# Patient Record
Sex: FEMALE | Race: WHITE | NOT HISPANIC OR LATINO | Employment: FULL TIME | URBAN - METROPOLITAN AREA
[De-identification: names, ages, dates, MRNs, and addresses within clinical notes are randomized per-mention and may not be internally consistent; named-entity substitution may affect disease eponyms.]

---

## 2017-03-02 ENCOUNTER — OFFICE VISIT (OUTPATIENT)
Dept: URGENT CARE | Facility: URGENT CARE | Age: 47
End: 2017-03-02
Payer: COMMERCIAL

## 2017-03-02 ENCOUNTER — RADIANT APPOINTMENT (OUTPATIENT)
Dept: GENERAL RADIOLOGY | Facility: CLINIC | Age: 47
End: 2017-03-02
Attending: PHYSICIAN ASSISTANT
Payer: COMMERCIAL

## 2017-03-02 VITALS
OXYGEN SATURATION: 97 % | TEMPERATURE: 98.2 F | SYSTOLIC BLOOD PRESSURE: 112 MMHG | HEART RATE: 72 BPM | HEIGHT: 69 IN | WEIGHT: 204.4 LBS | BODY MASS INDEX: 30.27 KG/M2 | DIASTOLIC BLOOD PRESSURE: 72 MMHG

## 2017-03-02 DIAGNOSIS — R09.89 RHONCHI AT RIGHT LUNG BASE: ICD-10-CM

## 2017-03-02 DIAGNOSIS — J18.9 PNEUMONIA OF RIGHT LUNG DUE TO INFECTIOUS ORGANISM, UNSPECIFIED PART OF LUNG: ICD-10-CM

## 2017-03-02 DIAGNOSIS — J18.9 PNEUMONIA OF RIGHT LUNG DUE TO INFECTIOUS ORGANISM, UNSPECIFIED PART OF LUNG: Primary | ICD-10-CM

## 2017-03-02 PROCEDURE — 99214 OFFICE O/P EST MOD 30 MIN: CPT | Performed by: PHYSICIAN ASSISTANT

## 2017-03-02 PROCEDURE — 71020 XR CHEST 2 VW: CPT

## 2017-03-02 RX ORDER — ALBUTEROL SULFATE 90 UG/1
2 AEROSOL, METERED RESPIRATORY (INHALATION) EVERY 6 HOURS
Qty: 1 INHALER | Refills: 0 | Status: SHIPPED | OUTPATIENT
Start: 2017-03-02 | End: 2019-12-26

## 2017-03-02 RX ORDER — AZITHROMYCIN 250 MG/1
TABLET, FILM COATED ORAL
Qty: 6 TABLET | Refills: 0 | Status: SHIPPED | OUTPATIENT
Start: 2017-03-02 | End: 2019-01-10

## 2017-03-02 RX ORDER — CODEINE PHOSPHATE AND GUAIFENESIN 10; 100 MG/5ML; MG/5ML
5-10 SOLUTION ORAL
Qty: 120 ML | Refills: 0 | Status: SHIPPED | OUTPATIENT
Start: 2017-03-02 | End: 2019-12-26

## 2017-03-02 NOTE — MR AVS SNAPSHOT
"              After Visit Summary   3/2/2017    Slime Sexton    MRN: [id number]           Patient Information     Date Of Birth          1970        Visit Information        Provider Department      3/2/2017 12:45 PM Jimenez Crow PA-C [facility]        Today's Diagnoses     Pneumonia of right lung due to infectious organism, unspecified part of lung    -  1    Rhonchi at right lung base           Follow-ups after your visit        Who to contact     If you have questions or need follow up information about today's clinic visit or your schedule please contact [facility] directly at [phone].  Normal or non-critical lab and imaging results will be communicated to you by [employer], letter or phone within 4 business days after the clinic has received the results. If you do not hear from us within 7 days, please contact the clinic through [employer] or phone. If you have a critical or abnormal lab result, we will notify you by phone as soon as possible.  Submit refill requests through [employer] or call your pharmacy and they will forward the refill request to us. Please allow 3 business days for your refill to be completed.          Additional Information About Your Visit        MyChart Information     [employer] gives you secure access to your electronic health record. If you see a primary care provider, you can also send messages to your care team and make appointments. If you have questions, please call your primary care clinic.  If you do not have a primary care provider, please call [phone] and they will assist you.        Care EveryWhere ID     This is your Care EveryWhere ID. This could be used by other organizations to access your [facility] medical records  [id number]        Your Vitals Were     Pulse Temperature Height Pulse Oximetry BMI (Body Mass Index)       72 98.2  F (36.8  C) (Oral) 5' 9\" (1.753 m) 97% 30.18 kg/m2        Blood " Pressure from Last 3 Encounters:   03/02/17 112/72   03/14/13 116/72   12/02/06 110/78    Weight from Last 3 Encounters:   03/02/17 204 lb 6.4 oz (92.7 kg)   03/14/13 186 lb 3.2 oz (84.5 kg)                 Today's Medication Changes          These changes are accurate as of: 3/2/17 11:59 PM.  If you have any questions, ask your nurse or doctor.               Start taking these medicines.        Dose/Directions    albuterol 108 (90 BASE) MCG/ACT Inhaler   Commonly known as:  PROVENTIL HFA   Used for:  Pneumonia of right lung due to infectious organism, unspecified part of lung, Rhonchi at right lung base   Started by:  Jimenez Crow PA-C        Dose:  2 puff   Inhale 2 puffs into the lungs every 6 hours   Quantity:  1 Inhaler   Refills:  0       azithromycin 250 MG tablet   Commonly known as:  ZITHROMAX   Used for:  Pneumonia of right lung due to infectious organism, unspecified part of lung, Rhonchi at right lung base   Started by:  Jimenez Crow PA-C        2 tabs po qd day 1, then 1 tab po qd days 2-5   Quantity:  6 tablet   Refills:  0       guaiFENesin-codeine 100-10 MG/5ML Soln solution   Commonly known as:  ROBITUSSIN AC   Used for:  Pneumonia of right lung due to infectious organism, unspecified part of lung   Started by:  Jimenez Crow PA-C        Dose:  5-10 mL   Take 5-10 mLs by mouth nightly as needed for cough   Quantity:  120 mL   Refills:  0            Where to get your medicines      [facility] [address] [address], [place] [address]     Phone:  [phone]     albuterol 108 (90 BASE) MCG/ACT Inhaler    azithromycin 250 MG tablet         Some of these will need a paper prescription and others can be bought over the counter.  Ask your nurse if you have questions.     Bring a paper prescription for each of these medications     guaiFENesin-codeine 100-10 MG/5ML Soln solution                 Primary Care Provider    None Specified       No primary provider on file.        Thank you!     Thank you for choosing [facility]  for your care. Our goal is always to provide you with excellent care. Hearing back from our patients is one way we can continue to improve our services. Please take a few minutes to complete the written survey that you may receive in the mail after your visit with us. Thank you!             Your Updated Medication List - Protect others around you: Learn how to safely use, store and throw away your medicines at www.disposemymeds.org.          This list is accurate as of: 3/2/17 11:59 PM.  Always use your most recent med list.                   Brand Name Dispense Instructions for use    albuterol 108 (90 BASE) MCG/ACT Inhaler    PROVENTIL HFA    1 Inhaler    Inhale 2 puffs into the lungs every 6 hours       azithromycin 250 MG tablet    ZITHROMAX    6 tablet    2 tabs po qd day 1, then 1 tab po qd days 2-5       guaiFENesin-codeine 100-10 MG/5ML Soln solution    ROBITUSSIN AC    120 mL    Take 5-10 mLs by mouth nightly as needed for cough       NO ACTIVE MEDICATIONS

## 2017-03-02 NOTE — NURSING NOTE
"Chief Complaint   Patient presents with     Cough     coughing, unable to sleep,lungs crackle 1x month        Initial /72  Pulse 72  Temp 98.2  F (36.8  C) (Oral)  Ht 5' 9\" (1.753 m)  Wt 204 lb 6.4 oz (92.7 kg)  SpO2 97%  BMI 30.18 kg/m2 Estimated body mass index is 30.18 kg/(m^2) as calculated from the following:    Height as of this encounter: 5' 9\" (1.753 m).    Weight as of this encounter: 204 lb 6.4 oz (92.7 kg).  Medication Reconciliation: complete    "

## 2017-03-03 NOTE — PROGRESS NOTES
"SUBJECTIVE:   Slime Sexton is a 46 year old female presenting with a chief complaint of coughing, chest congestion, productive cough and chills.  Onset of symptoms was 1 month(s) ago.  Course of illness is worsening.    Severity moderate  Current and Associated symptoms: wheezing  Treatment measures tried include OTC meds.  Predisposing factors include recent illness.    PMH:  none     Allergies   Allergen Reactions     Bee Venom Anaphylaxis     No Known Drug Allergies          Social History   Substance Use Topics     Smoking status: Never Smoker     Smokeless tobacco: Never Used     Alcohol use 0.5 oz/week     1 Cans of beer per week      Comment: per month       ROS:  CONSTITUTIONAL:POSITIVE  for chills  INTEGUMENTARY/SKIN: NEGATIVE for worrisome rashes, moles or lesions  ENT/MOUTH: NEGATIVE for ear, mouth and throat problems  RESP:POSITIVE for cough-productive and wheezing  CV: NEGATIVE for chest pain, palpitations or peripheral edema  GI: NEGATIVE for nausea, abdominal pain, heartburn, or change in bowel habits  MUSCULOSKELETAL: NEGATIVE for significant arthralgias or myalgia  NEURO: NEGATIVE for weakness, dizziness or paresthesias    OBJECTIVE  :/72  Pulse 72  Temp 98.2  F (36.8  C) (Oral)  Ht 5' 9\" (1.753 m)  Wt 204 lb 6.4 oz (92.7 kg)  SpO2 97%  BMI 30.18 kg/m2  GENERAL APPEARANCE: healthy, alert and no distress  EYES: EOMI,  PERRL, conjunctiva clear  HENT: ear canals and TM's normal.  Nose and mouth without ulcers, erythema or lesions  NECK: supple, nontender, no lymphadenopathy  RESP: expiratory wheezes   CV: regular rates and rhythm, normal S1 S2, no murmur noted  NEURO: Normal strength and tone, sensory exam grossly normal,  normal speech and mentation  SKIN: no suspicious lesions or rashes    Chest xray Negative for acute findings, read by Jimenez OBANDO at time of visit.    ASSESSMENT/PLAN:      ICD-10-CM    1. Pneumonia of right lung due to infectious organism, unspecified part of " lung J18.9 XR Chest 2 Views     azithromycin (ZITHROMAX) 250 MG tablet     albuterol (PROVENTIL HFA) 108 (90 BASE) MCG/ACT Inhaler     guaiFENesin-codeine (ROBITUSSIN AC) 100-10 MG/5ML SOLN solution   2. Rhonchi at right lung base R09.89 azithromycin (ZITHROMAX) 250 MG tablet     albuterol (PROVENTIL HFA) 108 (90 BASE) MCG/ACT Inhaler       Fluids, rest  Follow up as needed

## 2017-07-29 ENCOUNTER — HEALTH MAINTENANCE LETTER (OUTPATIENT)
Age: 47
End: 2017-07-29

## 2019-01-05 ENCOUNTER — OFFICE VISIT (OUTPATIENT)
Dept: URGENT CARE | Facility: URGENT CARE | Age: 49
End: 2019-01-05
Payer: COMMERCIAL

## 2019-01-05 ENCOUNTER — ANCILLARY PROCEDURE (OUTPATIENT)
Dept: GENERAL RADIOLOGY | Facility: CLINIC | Age: 49
End: 2019-01-05
Payer: COMMERCIAL

## 2019-01-05 VITALS
OXYGEN SATURATION: 97 % | DIASTOLIC BLOOD PRESSURE: 80 MMHG | BODY MASS INDEX: 31.28 KG/M2 | TEMPERATURE: 97.7 F | HEART RATE: 62 BPM | WEIGHT: 211.8 LBS | SYSTOLIC BLOOD PRESSURE: 122 MMHG

## 2019-01-05 DIAGNOSIS — R06.02 SOB (SHORTNESS OF BREATH): ICD-10-CM

## 2019-01-05 DIAGNOSIS — R09.89 CHEST CONGESTION: Primary | ICD-10-CM

## 2019-01-05 DIAGNOSIS — R07.89 ATYPICAL CHEST PAIN: ICD-10-CM

## 2019-01-05 DIAGNOSIS — R09.89 CHEST CONGESTION: ICD-10-CM

## 2019-01-05 LAB
BASOPHILS # BLD AUTO: 0 10E9/L (ref 0–0.2)
BASOPHILS NFR BLD AUTO: 0.2 %
D DIMER PPP FEU-MCNC: 0.2 UG/ML FEU (ref 0–0.5)
DIFFERENTIAL METHOD BLD: ABNORMAL
EOSINOPHIL # BLD AUTO: 0.1 10E9/L (ref 0–0.7)
EOSINOPHIL NFR BLD AUTO: 1.2 %
ERYTHROCYTE [DISTWIDTH] IN BLOOD BY AUTOMATED COUNT: 14.2 % (ref 10–15)
HCT VFR BLD AUTO: 39.8 % (ref 35–47)
HGB BLD-MCNC: 12.5 G/DL (ref 11.7–15.7)
LYMPHOCYTES # BLD AUTO: 2.3 10E9/L (ref 0.8–5.3)
LYMPHOCYTES NFR BLD AUTO: 27 %
MCH RBC QN AUTO: 25.3 PG (ref 26.5–33)
MCHC RBC AUTO-ENTMCNC: 31.4 G/DL (ref 31.5–36.5)
MCV RBC AUTO: 80 FL (ref 78–100)
MONOCYTES # BLD AUTO: 0.6 10E9/L (ref 0–1.3)
MONOCYTES NFR BLD AUTO: 6.9 %
NEUTROPHILS # BLD AUTO: 5.5 10E9/L (ref 1.6–8.3)
NEUTROPHILS NFR BLD AUTO: 64.7 %
PLATELET # BLD AUTO: 321 10E9/L (ref 150–450)
RBC # BLD AUTO: 4.95 10E12/L (ref 3.8–5.2)
WBC # BLD AUTO: 8.6 10E9/L (ref 4–11)

## 2019-01-05 PROCEDURE — 36415 COLL VENOUS BLD VENIPUNCTURE: CPT | Performed by: PHYSICIAN ASSISTANT

## 2019-01-05 PROCEDURE — 85379 FIBRIN DEGRADATION QUANT: CPT | Performed by: PHYSICIAN ASSISTANT

## 2019-01-05 PROCEDURE — 71046 X-RAY EXAM CHEST 2 VIEWS: CPT

## 2019-01-05 PROCEDURE — 99215 OFFICE O/P EST HI 40 MIN: CPT | Performed by: PHYSICIAN ASSISTANT

## 2019-01-05 PROCEDURE — 85025 COMPLETE CBC W/AUTO DIFF WBC: CPT | Performed by: PHYSICIAN ASSISTANT

## 2019-01-05 RX ORDER — AZITHROMYCIN 250 MG/1
TABLET, FILM COATED ORAL
Qty: 6 TABLET | Refills: 0 | Status: SHIPPED | OUTPATIENT
Start: 2019-01-05 | End: 2019-01-10

## 2019-01-07 NOTE — PROGRESS NOTES
SUBJECTIVE:  Slime Sexton is a 48 year old female who presents to the office with the CC of chest pain.  Patient complains of chest pain, starting 1 day ago  The pain is characterized as lung pain and pain with breathing  located mid chest with radiation to . Symptoms began 1 day(s) ago, still present  Pain is associated with pain with breathing.  Pain is exacerbated by breathing.  Pain is relieved by rest.  Cardiac risk factors: Obesity, sedentary lifestyle    PMH  Obesity    Allergies   Allergen Reactions     Bee Venom Anaphylaxis     No Known Drug Allergies      Social History     Tobacco Use     Smoking status: Never Smoker     Smokeless tobacco: Never Used   Substance Use Topics     Alcohol use: Yes     Alcohol/week: 0.5 oz     Types: 1 Cans of beer per week     Comment: per month     Family HX  HTN  Depression  Anxiety  GERD    ROS:CONSTITUTIONAL:NEGATIVE for fever, chills, change in weight  INTEGUMENTARY/SKIN: NEGATIVE for worrisome rashes, moles or lesions  EYES: NEGATIVE for vision changes or irritation  ENT/MOUTH: NEGATIVE for ear, mouth and throat problems  RESP:POSITIVE for pain with breathing  CV: POSITIVE for chest pain  GI: NEGATIVE for nausea, abdominal pain, heartburn, or change in bowel habits  : normal menstrual cycles  MUSCULOSKELETAL: NEGATIVE for significant arthralgias or myalgia  NEURO: NEGATIVE for weakness, dizziness or paresthesias    EXAM:  /80   Pulse 62   Temp 97.7  F (36.5  C) (Tympanic)   Wt 96.1 kg (211 lb 12.8 oz)   SpO2 97%   BMI 31.28 kg/m    GENERAL APPEARANCE: healthy, alert and no distress  EYES: EOMI,  PERRL, conjunctiva clear  HENT: ear canals and TM's normal.  Nose and mouth without ulcers, erythema or lesions  NECK: supple, nontender, no lymphadenopathy  RESP: lungs clear to auscultation - no rales, rhonchi or wheezes  CV: regular rates and rhythm, normal S1 S2, no murmur noted  ABDOMEN:  soft, nontender, no HSM or masses and bowel sounds normal  NEURO:  Normal strength and tone, sensory exam grossly normal,  normal speech and mentation  SKIN: no suspicious lesions or rashes     Office EKG demonstrates:  appears normal, NSR,normal axis, normal intervals, no acute ST/T changes c/w ischemia,no LVH by voltage criteria,unchanged from previous tracings    Chest xray Negative for acute findings, read by Jimenez OBANDO at time of visit.    Results for orders placed or performed in visit on 01/05/19   CBC with platelets differential   Result Value Ref Range    WBC 8.6 4.0 - 11.0 10e9/L    RBC Count 4.95 3.8 - 5.2 10e12/L    Hemoglobin 12.5 11.7 - 15.7 g/dL    Hematocrit 39.8 35.0 - 47.0 %    MCV 80 78 - 100 fl    MCH 25.3 (L) 26.5 - 33.0 pg    MCHC 31.4 (L) 31.5 - 36.5 g/dL    RDW 14.2 10.0 - 15.0 %    Platelet Count 321 150 - 450 10e9/L    % Neutrophils 64.7 %    % Lymphocytes 27.0 %    % Monocytes 6.9 %    % Eosinophils 1.2 %    % Basophils 0.2 %    Absolute Neutrophil 5.5 1.6 - 8.3 10e9/L    Absolute Lymphocytes 2.3 0.8 - 5.3 10e9/L    Absolute Monocytes 0.6 0.0 - 1.3 10e9/L    Absolute Eosinophils 0.1 0.0 - 0.7 10e9/L    Absolute Basophils 0.0 0.0 - 0.2 10e9/L    Diff Method Automated Method    D dimer quantitative   Result Value Ref Range    D Dimer 0.2 0.0 - 0.50 ug/ml FEU       Assessment  / IMPRESSION/PLAN      ICD-10-CM    1. Chest congestion R09.89 XR Chest 2 Views     CBC with platelets differential     D dimer quantitative     azithromycin (ZITHROMAX) 250 MG tablet   2. SOB (shortness of breath) R06.02 XR Chest 2 Views   3. Atypical chest pain R07.89 XR Chest 2 Views     CBC with platelets differential     D dimer quantitative       Orders Placed This Encounter     XR Chest 2 Views     CBC with platelets differential     D dimer quantitative     azithromycin (ZITHROMAX) 250 MG tablet       D-dimer to tule out PE  Chest xray to rule out pneumonia and pneumothorax  Cbc for infection  Follow up with PCP as needed  Go to the ED if symptoms worsen

## 2019-01-10 ENCOUNTER — OFFICE VISIT (OUTPATIENT)
Dept: INTERNAL MEDICINE | Facility: CLINIC | Age: 49
End: 2019-01-10
Payer: COMMERCIAL

## 2019-01-10 VITALS
WEIGHT: 211.9 LBS | DIASTOLIC BLOOD PRESSURE: 76 MMHG | OXYGEN SATURATION: 98 % | BODY MASS INDEX: 31.29 KG/M2 | SYSTOLIC BLOOD PRESSURE: 122 MMHG | HEART RATE: 74 BPM | TEMPERATURE: 98.6 F

## 2019-01-10 DIAGNOSIS — H00.011 HORDEOLUM EXTERNUM RIGHT UPPER EYELID: Primary | ICD-10-CM

## 2019-01-10 PROCEDURE — 99213 OFFICE O/P EST LOW 20 MIN: CPT | Performed by: PHYSICIAN ASSISTANT

## 2019-01-10 RX ORDER — CEPHALEXIN 500 MG/1
500 CAPSULE ORAL 4 TIMES DAILY
Qty: 28 CAPSULE | Refills: 0 | Status: SHIPPED | OUTPATIENT
Start: 2019-01-10 | End: 2019-01-17

## 2019-01-10 NOTE — PROGRESS NOTES
SUBJECTIVE:   Slime Sexton is a 48 year old female who presents to clinic today for the following health issues:    ED/UC Followup:    Facility:  [facility]  Date of visit: 01/05/2019  Reason for visit: Chest congestion  Current Status: Improved  Patient notes chest cold symptoms have resolved.   She now has new concerns for her right eye. She notes her eye is red and swollen. She notes the area is tender to touch. She has no vision changes or difficulty moving the eye itself. She reports it feels like a stye, but it is not improving as fast as it has in the past. She denies fever and body aches.      Problem list and histories reviewed & adjusted, as indicated.  Additional history: as documented    Reviewed and updated as needed this visit by clinical staff  Tobacco  Allergies  Meds  Problems       Reviewed and updated as needed this visit by Provider  Meds  Problems       OBJECTIVE:     /76   Pulse 74   Temp 98.6  F (37  C) (Oral)   Wt 96.1 kg (211 lb 14.4 oz)   SpO2 98%   BMI 31.29 kg/m    Body mass index is 31.29 kg/m .  GENERAL: healthy, alert and no distress  EYES: PERRL, EOMI, conjunctivae and sclerae normal and eyelids- right lid is swollen and erythematous at the bottom with a point of tenderness spot    ASSESSMENT/PLAN:       1. Hordeolum externum right upper eyelid  - suspect stye with co-infection   - treatment as below along with symptomatic measures   - follow up if no improvement or if symptoms worsen, reviewed when urgent follow up is needed   - cephALEXin (KEFLEX) 500 MG capsule; Take 1 capsule (500 mg) by mouth 4 times daily for 7 days  Dispense: 28 capsule; Refill: 0    Niecy M. Simone Munroe PA-C  [facility]

## 2019-09-07 ENCOUNTER — NURSE TRIAGE (OUTPATIENT)
Dept: NURSING | Facility: CLINIC | Age: 49
End: 2019-09-07

## 2019-09-07 ENCOUNTER — OFFICE VISIT (OUTPATIENT)
Dept: URGENT CARE | Facility: URGENT CARE | Age: 49
End: 2019-09-07
Payer: COMMERCIAL

## 2019-09-07 VITALS
OXYGEN SATURATION: 99 % | RESPIRATION RATE: 20 BRPM | TEMPERATURE: 98.1 F | BODY MASS INDEX: 32.34 KG/M2 | DIASTOLIC BLOOD PRESSURE: 70 MMHG | WEIGHT: 219 LBS | HEART RATE: 74 BPM | SYSTOLIC BLOOD PRESSURE: 120 MMHG

## 2019-09-07 DIAGNOSIS — R22.1 THROAT SWELLING: ICD-10-CM

## 2019-09-07 DIAGNOSIS — T78.2XXA ANAPHYLAXIS, INITIAL ENCOUNTER: Primary | ICD-10-CM

## 2019-09-07 DIAGNOSIS — L50.9 HIVES: ICD-10-CM

## 2019-09-07 PROCEDURE — 96372 THER/PROPH/DIAG INJ SC/IM: CPT | Performed by: PHYSICIAN ASSISTANT

## 2019-09-07 PROCEDURE — 99215 OFFICE O/P EST HI 40 MIN: CPT | Mod: 25 | Performed by: PHYSICIAN ASSISTANT

## 2019-09-07 RX ORDER — CETIRIZINE HYDROCHLORIDE 10 MG/1
10 TABLET ORAL ONCE
Status: COMPLETED | OUTPATIENT
Start: 2019-09-07 | End: 2019-09-07

## 2019-09-07 RX ORDER — EPINEPHRINE 0.3 MG/.3ML
0.3 INJECTION SUBCUTANEOUS
Qty: 0.3 ML | Refills: 0 | Status: SHIPPED | OUTPATIENT
Start: 2019-09-07

## 2019-09-07 RX ORDER — METHYLPREDNISOLONE SOD SUCC 125 MG
125 VIAL (EA) INJECTION ONCE
Status: COMPLETED | OUTPATIENT
Start: 2019-09-07 | End: 2019-09-07

## 2019-09-07 RX ORDER — CETIRIZINE HYDROCHLORIDE 10 MG/1
10 TABLET ORAL DAILY
Qty: 30 TABLET | Refills: 0 | Status: SHIPPED | OUTPATIENT
Start: 2019-09-07 | End: 2019-09-20

## 2019-09-07 RX ADMIN — CETIRIZINE HYDROCHLORIDE 10 MG: 10 TABLET ORAL at 13:50

## 2019-09-07 RX ADMIN — Medication 125 MG: at 13:50

## 2019-09-07 NOTE — TELEPHONE ENCOUNTER
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  Daughter is wanting to know if Slime should still come into ED.      Reason for Disposition    [1] Gave epinephrine shot AND [2] no symptoms now    Additional Information    Negative: [1] Life-threatening reaction (anaphylaxis) in the past to sting AND [2] < 2 hours since sting    Negative: Attacked by swarm of bees now    Negative: Passed out (i.e., lost consciousness, collapsed and was not responding)    Negative: Wheezing, stridor, or difficulty breathing    Negative: [1] Hoarseness or cough AND [2] sudden onset following sting    Negative: [1] Tightness in the throat or chest AND [2] sudden onset following sting    Negative: [1] Swollen tongue or difficulty swallowing AND [2] sudden onset following sting    Negative: Sounds like a life-threatening emergency to the triager    Negative: [1] Widespread hives, itching or facial swelling AND [2] started within 2 hours of sting (Exception: only at site of sting)    Negative: [1] Vomiting or abdominal cramps AND [2] started within 2 hours of sting    Protocols used: BEE OR YELLOW JACKET STING-A-

## 2019-09-07 NOTE — PATIENT INSTRUCTIONS
Patient Education     Anaphylaxis  Anaphylaxis is a severe allergic reaction that can be life threatening. This reaction can happen in a few minutes, or a few hours after exposure to what you are allergic to. Some people are more prone to this than others.  The symptoms of an anaphylactic reaction may at first seem similar to other allergic reactions. If this has happened to you in the past, do not let the initial mild symptoms, such as a rash, hives and itching, mislead you. Your reaction can worsen very quickly and become much more severe and life threatening within minutes.  More severe symptoms include:    Trouble swallowing, feeling like your throat is closing    Trouble breathing, wheezing    Cool, moist or pale (blue in color) skin    Hoarse voice or trouble speaking    Nausea, vomiting, diarrhea, stomach cramps, or pain    Feeling faint or lightheaded, rapid heart rate, low blood pressure    Feeling dizzy or confused    Becoming very drowsy, poorly responsive, or trouble awakening    Seizure  Sometimes the cause may be obvious, like knowing you are allergic to peanuts. To help identify your allergen, remember:    When it started    What you were doing at the time or just before that    Any activities you were involved in    Any new products or contacts   Here are some common causes, but remember almost anything can cause a reaction, and you may not even be aware that you came into contact with one of these things.    Dust, mold, pollen    Plants, such as poison ivy and poison oak    Animals    Foods such as shrimp, shellfish, peanuts, milk products, gluten, eggs; also colorings, flavorings, additives    Insect bites or stings such as bees, mosquitos, fleas, ticks    Medicines such as penicillin, sulfa drugs, amoxicillin, aspirin, ibuprofen; any medicine can cause a reaction    Jewelry such as nickel, or gold (new, or something you ve worn for a while including zippers, and buttons)    Latex such as in  gloves, clothes, toys, balloons, or some tapes (some people allergic to latex may also  have problems with foods like bananas, avocados, kiwi, papaya, or chestnuts)    Lotions, perfumes, cosmetics, soaps, shampoos, skincare products, nail products    Chemicals or dyes in clothing, linen, , hair dyes, soaps, iodine  If you are exposed to the same substance again, you may have the same or more severe reaction. Treatment for anaphylaxis is epinephrine (adrenalin). This is available by prescription as a self-injectable pen. If the cause of your reaction is known, you should avoid exposure in the future. If the cause is not known, follow up with your doctor for special testing to determine what you are allergic to.     Home care  If it was safe for you to go home, watch for any worsening of symptoms. You may need to be treated again.  Medicines  Injectable epinephrine  One of the key tools in treating anaphylaxis is early use of epinephrine. If you had a severe allergic or anaphylactic reaction, the doctor may prescribe a self- injectable epinephrine kit. If this was prescribed, carry it at all times. It can be life saving. Epinephrine can help stop the progression of an allergic reaction. Its effects are brief, so after you use the medicine, it is still very important to call 911 and get to an emergency room.  When to use injectable epinephrine. Use the epinephrine if you have a history of severe reactions or any of the following symptoms:    Swelling in your mouth or throat     Trouble speaking or swallowing    Trouble breathing    Feeling faint, low blood pressure, or becoming drowsy or poorly responsive    Worsening rash  How to use injectable epinephrine:    Hold the syringe firmly in your hand with the orange (or black) needle end away from your thumb    Be careful not to stick your fingers or hand with the needle.    At the opposite end, pull off the activation cap- the blue or grey tab    Holding the  syringe tightly, jab it into the outer part of your upper thigh. This is one of the softest, fleshiest parts of the upper leg, and is not near a major blood vessel or nerve. Be careful not to inject it into your hip or any place that there is a pulse.    You can inject it through pants, but make sure not to inject it into the seam of the pants.    Do not pull it out right away. Try to hold the needle in place for 10 seconds.    Massage the spot for a few seconds or as directed by your healthcare provider.    If you are injecting it in someone else or a child, try to hold them or their leg still. If they jerk or yank their legs away as you are doing it, it can cause a cut on their leg.  You may feel shaky, jittery, nervous, and anxious after the injection. Although it is difficult, try to relax. This is a side effect of the epinephrine, and should stop after a few minutes   Important    Get to the emergency room after using the epinephrine. Its affect will wear off, and you may have a second reaction. This could even happen hours later.    Never intentionally eat, use, or expose yourself to the substance that caused the anaphylactic reaction.  Nothing is foolproof, including the injectable epinephrine.  Other medicines  The doctor may prescribe medicine to relieve swelling, itching, and pain. Follow the doctor s instructions when using this medicine.     Oral diphenhydramine is an antihistamine available at drug and grocery stores. Unless a prescription antihistamine was given, diphenhydramine may be used to reduce itching if large areas of the skin are involved. It may make you sleepy, so be careful using it in the daytime or when going to school, working, or driving.     Do not use diphenhydramine cream on your skin, because in some people it can cause a further reaction.    You may use over-the-counter acetaminophen or ibuprofen to control pain, unless another pain medicine was prescribed.    If you were  prescribed any medicines to prevent symptoms from returning, be sure to take them exactly as directed.  General care    Rest at home for the next 24 hours.    Avoid tobacco and alcohol consumption. These may worsen your symptoms.    If you know what caused your reaction today, avoid that in the future since the next reaction may be worse. Let your family members, friends and personal physician know about your allergic reaction.    If your allergy was to food, learn how to read food labels so you can check for that ingredient. If a product does not have a label, it is best to avoid it.    Consider carrying an identification card or getting a medical alert bracelet to inform medical personnel of your condition in case you cannot tell them.    Tell all of your healthcare providers that you had an anaphylactic reaction. Make certain the information is added to your electronic or paper medical records.  Follow-up care  Follow up with your doctor or as advised if you are not improving over the next 1 to 2 days.  Call 911  Call 911 if any of these occur:    Trouble breathing or swallowing, wheezing    Hoarse voice or trouble speaking    Chest pain    Confused    Very drowsy or trouble awakening    Fainting or loss of consciousness    Rapid heart rate    Vomiting blood, or large amounts of blood in stool    Seizure    Swelling in the eyes, mouth, face, or tongue    Dizziness or weakness    Cool, moist, or pale (blue in color) skin    Nausea, vomiting, diarrhea, stomach cramps, or abdominal pain  When to seek medical advice  Call your healthcare provider right away if any of these occur:    Worsening of your symptoms    New symptoms develop    Symptoms don't go away or come back  Date Last Reviewed: 4/1/2017 2000-2018 [employer]. [address] [place] [address], [place] [address]. All rights reserved. This information is not intended as a substitute for professional medical care. Always follow your healthcare  professional's instructions.           Patient Education     Using an Injection Pen  Your healthcare provider has prescribed a medicine that you can give yourself using an injection pen. One medicine that is often given with an injection pen is insulin. Injection pens are popular because they are easy to use. Also many people feel more comfortable using something that looks like a pen instead of a syringe. Some kinds of pens you can throw away (disposable). Others should not be thrown away (nondisposable).  Disposable pens come already filled with a set amount of medicine. Once you inject the medicine you throw the pen away. With nondisposable pens, you replace the medicine barnett (cartridge) when it is empty.  Both types of pens use a pen needle. This is screwed onto the tip of the pen before you use it. Pen needles come in different lengths and thicknesses.  Home care  Follow these steps when using the injection pen. First, get these supplies together:    Alcohol swabs    Injector pen    Cartridge, if the pen is nondisposable    Special container for the used needles and disposable pens (sharps container). You can buy a sharps container at a pharmacy or medical supply store. You can also use an empty laundry detergent bottle, or any other puncture-proof container and lid.  Then get the pen ready:    Wash your hands.    Remove the pen cap.    Check the medicine to make sure it is the type your healthcare provider prescribed. Make sure that it has not , and is not discolored, frosted, or lumpy. If the medicine doesn't look right to you, don t use it. Get a new cartridge or a new disposable pen. Never share injection pens or medicine cartridges.    Attach a needle to your pen. Read the directions that came with your pen. They contain steps for attaching a needle. If you re using a nondisposable pen, don t leave the needle attached to the pen between shots.    Mix the medicine by rolling the pen between the  palms of your hands about 20 times. You can also tip the pen back and forth.  Prime your pen and make sure that it is working by doing a test shot into the air. Do this before your actually inject the medicine. Then set the dose.    Dial the pen to deliver 2 or 3 units of medicine.    Hold the pen like a pencil, with the needle pointing up.    Tap the barrel of the pen. This will make sure any air bubbles in the cartridge float to the top of the cartridge.    Push down firmly on the pen's injector button. This will shoot medicine into the air. You should see a couple of drops of medicine come out of the needle. If nothing comes out, try doing another air shot. If medicine still doesn't come out after a second try, your pen may be low on medicine. Or the needle may not be connected properly. Read the troubleshooting tips in the directions that came with your pen.    Set your dose. Dial the pen to deliver the amount of medicine you need to take. As you turn the dial, you should hear a clicking sound. Your pen is now ready to use.    Choose an injection site. The belly (abdomen), upper arms, thighs, and buttocks are the most common sites to use. Stay away from sites that are close to a mole or scar, or that are closer than 2 inches to your belly button.    Make sure the site is clean. Clean it with an alcohol swab. Let it dry.    Pinch up a fold of skin around the site you have selected. Hold it firmly with one hand.    Hold the injection pen like a pencil in your other hand.  Inject your medicine  Insert the needle into your pinched-up skin. The needle should be straight up and down. Thin adults or children may need to inject with the needle slightly to the left or right.    Make sure the needle gets all the way into the fatty tissue below the skin.    Push the pen injection button. Unless you take a very small dose, the injection should take a couple of seconds.    Let go of the skin and remove the needle from your  skin.  After the injection    For a nondisposable pen, remove the needle by unscrewing it.    Put any used needles and disposable pens in the sharps container. Make sure that needles point down. Never put your fingers into the container.    When the sharps container is full, take it back to your healthcare facility. The staff will get rid of it for you.  Follow-up care  Follow up with your healthcare provider, or as advised.  When to seek medical advice   Call your healthcare provider right away if any of these occur:    Problems that keep you from giving your injection    Bleeding at the injection site for more than 10 minutes    Pain at the injection site that does not go away    You inject the medicine in the wrong area    You inject too much of the medicine    Rash at the injection site    Fever of 100.4 F (38 C) or higher    Redness, warmth, swelling, or drainage at the injection site    Signs of allergic reaction. These include trouble breathing, hives, or a rash.  Date Last Reviewed: 6/1/2016 2000-2018 [employer]. [address] [place] [address]. All rights reserved. This information is not intended as a substitute for professional medical care. Always follow your healthcare professional's instructions.

## 2019-09-07 NOTE — NURSING NOTE
Clinic Administered Medication Documentation    MEDICATION LIST:   Injectable Medication Documentation    Patient was given solu medrol. Prior to medication administration, verified patients identity using patient s name and date of birth. Please see MAR and medication order for additional information. Patient instructed to remain in clinic for 15 minutes and report any adverse reaction to staff immediately .      Was entire vial of medication used? Yes  Vial/Syringe: Single dose vial  Expiration Date:  12/21  Was this medication supplied by the patient? No

## 2019-09-10 NOTE — PROGRESS NOTES
SUBJECTIVE:   Slime Sexton is a 48 year old female presenting with a chief complaint of anaphylactic reaction, throat swelling, lightheadedness.  Onset of symptoms was 1 hour(s) ago.  Course of illness is same.    Severity moderate  Current and Associated symptoms: throat swelling, hives, itching and chest tightness  Treatment measures tried include epi pen.  Predisposing factors include allergic reactions.    PMH  Allergies     Allergies   Allergen Reactions     Bee Venom Anaphylaxis     No Known Drug Allergies      Family hx  Allergies  Depression    Social History     Tobacco Use     Smoking status: Never Smoker     Smokeless tobacco: Never Used   Substance Use Topics     Alcohol use: Yes     Alcohol/week: 0.5 oz     Types: 1 Cans of beer per week     Comment: per month       ROS:  CONSTITUTIONAL:NEGATIVE for fever, chills, change in weight  INTEGUMENTARY/SKIN: POSITIVE for hives and itching  EYES: NEGATIVE for vision changes or irritation  ENT/MOUTH: POSITIVE for throat swelling  RESP:POSITIVE for chest tightness  CV: NEGATIVE for chest pain, palpitations or peripheral edema  GI: NEGATIVE for nausea, abdominal pain, heartburn, or change in bowel habits  : negative for and dysuria  MUSCULOSKELETAL: NEGATIVE for significant arthralgias or myalgia  NEURO: NEGATIVE for weakness, dizziness or paresthesias    OBJECTIVE  :/70 (Cuff Size: Adult Large)   Pulse 74   Temp 98.1  F (36.7  C) (Oral)   Resp 20   Wt 99.3 kg (219 lb)   SpO2 99%   BMI 32.34 kg/m    GENERAL APPEARANCE: healthy, alert and no distress  EYES: EOMI,  PERRL, conjunctiva clear  HENT: ear canals and TM's normal.  Nose and mouth without ulcers, erythema or lesions  NECK: supple, nontender, no lymphadenopathy  RESP: lungs clear to auscultation - no rales, rhonchi or wheezes  CV: regular rates and rhythm, normal S1 S2, no murmur noted  ABDOMEN: Negative for abdominal pain  Extremities: no peripheral edema or tenderness, peripheral  pulses normal  MS: extremities normal- no gross deformities noted, no erythema, FROM noted in all extremities  NEURO: Normal strength and tone, sensory exam grossly normal,  normal speech and mentation  SKIN: positive for itching    ASSESSMENT/PLAN:    ICD-10-CM    1. Anaphylaxis, initial encounter T78.2XXA cetirizine (zyrTEC) tablet 10 mg     methylPREDNISolone sodium succinate (solu-MEDROL) injection 125 mg     cetirizine (ZYRTEC) 10 MG tablet     EPINEPHrine (EPIPEN/ADRENACLICK/OR ANY BX GENERIC EQUIV) 0.3 MG/0.3ML injection 2-pack     INJECTION INTRAMUSCULAR OR SUB-Q   2. Hives L50.9 cetirizine (zyrTEC) tablet 10 mg     methylPREDNISolone sodium succinate (solu-MEDROL) injection 125 mg     cetirizine (ZYRTEC) 10 MG tablet     EPINEPHrine (EPIPEN/ADRENACLICK/OR ANY BX GENERIC EQUIV) 0.3 MG/0.3ML injection 2-pack     INJECTION INTRAMUSCULAR OR SUB-Q   3. Throat swelling R22.1 cetirizine (zyrTEC) tablet 10 mg     methylPREDNISolone sodium succinate (solu-MEDROL) injection 125 mg     cetirizine (ZYRTEC) 10 MG tablet     EPINEPHrine (EPIPEN/ADRENACLICK/OR ANY BX GENERIC EQUIV) 0.3 MG/0.3ML injection 2-pack     INJECTION INTRAMUSCULAR OR SUB-Q       Orders Placed This Encounter     INJECTION INTRAMUSCULAR OR SUB-Q     cetirizine (zyrTEC) tablet 10 mg     methylPREDNISolone sodium succinate (solu-MEDROL) injection 125 mg     cetirizine (ZYRTEC) 10 MG tablet     EPINEPHrine (EPIPEN/ADRENACLICK/OR ANY BX GENERIC EQUIV) 0.3 MG/0.3ML injection 2-pack     Patient received epi, solumedrol and zyrtec for allergic reactions, anaphylaxis  Patient has epi pen refills  Zyrtec for itching  Medrol 125 mg given IM  Information given on anaphylaxis reaction  Discussed future reactions and medications

## 2019-09-20 ENCOUNTER — TELEPHONE (OUTPATIENT)
Dept: INTERNAL MEDICINE | Facility: CLINIC | Age: 49
End: 2019-09-20

## 2019-09-20 ENCOUNTER — MYC MEDICAL ADVICE (OUTPATIENT)
Dept: INTERNAL MEDICINE | Facility: CLINIC | Age: 49
End: 2019-09-20

## 2019-09-20 ENCOUNTER — OFFICE VISIT (OUTPATIENT)
Dept: INTERNAL MEDICINE | Facility: CLINIC | Age: 49
End: 2019-09-20
Payer: COMMERCIAL

## 2019-09-20 VITALS
SYSTOLIC BLOOD PRESSURE: 118 MMHG | WEIGHT: 220 LBS | HEART RATE: 77 BPM | OXYGEN SATURATION: 98 % | RESPIRATION RATE: 18 BRPM | TEMPERATURE: 98.6 F | DIASTOLIC BLOOD PRESSURE: 76 MMHG | BODY MASS INDEX: 32.49 KG/M2

## 2019-09-20 DIAGNOSIS — M25.511 CHRONIC RIGHT SHOULDER PAIN: ICD-10-CM

## 2019-09-20 DIAGNOSIS — L98.9 SKIN LESION: ICD-10-CM

## 2019-09-20 DIAGNOSIS — Z13.220 LIPID SCREENING: ICD-10-CM

## 2019-09-20 DIAGNOSIS — G89.29 CHRONIC RIGHT SHOULDER PAIN: ICD-10-CM

## 2019-09-20 DIAGNOSIS — R10.13 EPIGASTRIC PAIN: Primary | ICD-10-CM

## 2019-09-20 PROCEDURE — 99214 OFFICE O/P EST MOD 30 MIN: CPT | Performed by: PHYSICIAN ASSISTANT

## 2019-09-20 RX ORDER — OMEPRAZOLE 40 MG/1
40 CAPSULE, DELAYED RELEASE ORAL DAILY
Qty: 90 CAPSULE | Refills: 1 | Status: SHIPPED | OUTPATIENT
Start: 2019-09-20 | End: 2020-09-01

## 2019-09-20 SDOH — HEALTH STABILITY: MENTAL HEALTH: HOW OFTEN DO YOU HAVE A DRINK CONTAINING ALCOHOL?: MONTHLY OR LESS

## 2019-09-20 NOTE — PROGRESS NOTES
Subjective     Slime Sexton is a 48 year old female who presents to clinic today for the following health issues:    HPI     Patient is here today because of questions about symptoms she is now having.   Saturday of last week feeling heaviness in chest, Sunday having a lot of acid reflux symptoms such as burning in throat, feeling uneasy, nauseous, and no appetite.   - Saturday, duration 30 minutes, followed chips and salsa was at [employer]   - Sunday, duration 8 hours, off and on, eating breakfast   - Patient continues to have symptoms daily   - then noticed a few days later, noticed something on ribs, thinks hernia  - lots of burping, some acid taste and regurgitation   - does some walking, no symptoms   - some heaviness, but no chest pain, shortness of breath       Reviewed and updated as needed this visit by Provider  Meds  Problems             Objective    /76   Pulse 77   Temp 98.6  F (37  C) (Oral)   Resp 18   Wt 99.8 kg (220 lb)   LMP 08/15/2019   SpO2 98%   BMI 32.49 kg/m    Body mass index is 32.49 kg/m .  Physical Exam   GENERAL: healthy, alert and no distress  RESP: lungs clear to auscultation - no rales, rhonchi or wheezes  CV: regular rates and rhythm, normal S1 S2, no S3 or S4 and no murmur, click or rub  ABDOMEN: soft,  without hepatosplenomegaly or masses, tenderness epigastric and bowel sounds normal  MS: noted mid abdomen seperation    Diagnostic Test Results:  Labs reviewed in Epic        Assessment & Plan     1. Epigastric pain  - work up as below, add in omeprazole, trial for 6-8 weeks, if no improvement referral to GI  - omeprazole (PRILOSEC) 40 MG DR capsule; Take 1 capsule (40 mg) by mouth daily  Dispense: 90 capsule; Refill: 1  - also noted recti diastases on exam, referral to confirm and treat   - [facility]; Future  - CBC with platelets and differential; Future  - Comprehensive metabolic panel; Future  - Helicobacter pylori Antigen Stool;  Future    2. Chronic right shoulder pain  - requesting referral   - [facility]; Future    3. Lipid screening  - Lipid panel reflex to direct LDL Fasting; Future    4. Skin lesion  - referral requested   - DERMATOLOGY REFERRAL       Return in about 6 weeks (around 11/1/2019) for Physical Exam and stomach follow up .    Niecy Munroe PA-C  [facility]

## 2019-09-20 NOTE — TELEPHONE ENCOUNTER
Provider please advise of MyChart message regarding 2 medications that were discussed at Today's office visit?    Looks like Prilosec was sent. Was patient to purchase Pepcid complete OTC?    Josefina BRITO RN, BSN, PHN

## 2019-09-20 NOTE — TELEPHONE ENCOUNTER
Called patient. Says she is unsure what this appointment is really for, just follow up. No chest pain. No trouble breathing. No new bee stings since UC visit, reports it has been almost 2 weeks since the bee sting. She is dealing with GERD and some pressure around esophagus. Unsure if related to the bee sting/anaphylaxis or if something else. She wants to talk to Niecy TAYLOR more about it at appt today. Patient does not feel she is having any emergency room type symptoms.

## 2019-09-20 NOTE — TELEPHONE ENCOUNTER
"Please triage patient on Niecy's schedule for today for \"bug sting on rt hip--having symptoms\". See recent UC visit.  "

## 2019-11-04 ENCOUNTER — HEALTH MAINTENANCE LETTER (OUTPATIENT)
Age: 49
End: 2019-11-04

## 2019-12-26 ENCOUNTER — OFFICE VISIT (OUTPATIENT)
Dept: INTERNAL MEDICINE | Facility: CLINIC | Age: 49
End: 2019-12-26
Payer: COMMERCIAL

## 2019-12-26 VITALS
WEIGHT: 217.3 LBS | HEART RATE: 81 BPM | SYSTOLIC BLOOD PRESSURE: 124 MMHG | TEMPERATURE: 101 F | RESPIRATION RATE: 17 BRPM | BODY MASS INDEX: 32.09 KG/M2 | DIASTOLIC BLOOD PRESSURE: 80 MMHG | OXYGEN SATURATION: 97 %

## 2019-12-26 DIAGNOSIS — J06.9 UPPER RESPIRATORY TRACT INFECTION, UNSPECIFIED TYPE: Primary | ICD-10-CM

## 2019-12-26 LAB
DEPRECATED S PYO AG THROAT QL EIA: NORMAL
FLUAV+FLUBV AG SPEC QL: NEGATIVE
FLUAV+FLUBV AG SPEC QL: NEGATIVE
SPECIMEN SOURCE: NORMAL
SPECIMEN SOURCE: NORMAL

## 2019-12-26 PROCEDURE — 99213 OFFICE O/P EST LOW 20 MIN: CPT | Performed by: INTERNAL MEDICINE

## 2019-12-26 PROCEDURE — 87880 STREP A ASSAY W/OPTIC: CPT | Performed by: INTERNAL MEDICINE

## 2019-12-26 PROCEDURE — 87804 INFLUENZA ASSAY W/OPTIC: CPT | Performed by: INTERNAL MEDICINE

## 2019-12-26 PROCEDURE — 87081 CULTURE SCREEN ONLY: CPT | Performed by: INTERNAL MEDICINE

## 2019-12-26 RX ORDER — ALBUTEROL SULFATE 90 UG/1
2 AEROSOL, METERED RESPIRATORY (INHALATION) EVERY 6 HOURS
COMMUNITY
End: 2020-09-01

## 2019-12-26 RX ORDER — CODEINE PHOSPHATE AND GUAIFENESIN 10; 100 MG/5ML; MG/5ML
1-2 SOLUTION ORAL EVERY 4 HOURS PRN
Qty: 180 ML | Refills: 0 | Status: SHIPPED | OUTPATIENT
Start: 2019-12-26 | End: 2020-09-01

## 2019-12-26 RX ORDER — OSELTAMIVIR PHOSPHATE 75 MG/1
75 CAPSULE ORAL 2 TIMES DAILY
Qty: 10 CAPSULE | Refills: 0 | Status: CANCELLED | OUTPATIENT
Start: 2019-12-26 | End: 2019-12-31

## 2019-12-26 NOTE — LETTER
[facility]  [address] [address]  [phone]      12/26/2019       Slime Sexton  [address] [facility] [id number]        To Whom it May Concern:    Slime Sexton missed work due to recent illness.  Please excuse her from work during this time frame.  Slime may not return to work without restriction until her fevers have resolved with the plan of returning to work on 12/30/19.    Please contact me for questions or concerns.    Sincerely,       Jah Fritz MD  [facility] INTERNAL MEDICINE

## 2019-12-26 NOTE — PROGRESS NOTES
"Subjective     Slime Sexton is a 49 year old female who presents to clinic today for the following health issues:    HPI     RESPIRATORY SYMPTOMS    New patient to me.      Duration: 2 weeks ago, worse 2-3 days ago.  Notes a mild Sore Throat also.  Patient informs me that she has not had a flu vaccination this year.    Description  rhinorrhea, sore throat, facial pain/pressure, cough, wheezing, fever, chills, headache, fatigue/malaise, hoarse voice, nausea and myalgias    Severity: severe    Accompanying signs and symptoms: None    History (predisposing factors):  Reactive airway     Precipitating or alleviating factors: \"everyone is sick at work\"     Therapies tried and outcome:  Dayquil/ nyquil- helps short-term       There is no problem list on file for this patient.    Past Surgical History:   Procedure Laterality Date     OVARY SURGERY         Social History     Tobacco Use     Smoking status: Never Smoker     Smokeless tobacco: Never Used   Substance Use Topics     Alcohol use: Yes     Alcohol/week: 0.8 standard drinks     Types: 1 Cans of beer per week     Frequency: Monthly or less     Comment: per month     Family History   Problem Relation Age of Onset     Chronic Obstructive Pulmonary Disease Mother      Lung Cancer Father          Current Outpatient Medications   Medication Sig Dispense Refill     EPINEPHrine (EPIPEN/ADRENACLICK/OR ANY BX GENERIC EQUIV) 0.3 MG/0.3ML injection 2-pack Inject 0.3 mLs (0.3 mg) into the muscle once as needed for anaphylaxis 0.3 mL 0     LORATADINE ALLERGY RELIEF PO        NO ACTIVE MEDICATIONS        omeprazole (PRILOSEC) 40 MG DR capsule Take 1 capsule (40 mg) by mouth daily 90 capsule 1     Allergies   Allergen Reactions     Bee Venom Anaphylaxis     No Known Drug Allergies      BP Readings from Last 3 Encounters:   12/26/19 124/80   09/20/19 118/76   09/07/19 120/70    Wt Readings from Last 3 Encounters:   09/20/19 99.8 kg (220 lb)   09/07/19 99.3 kg (219 lb) "   01/10/19 96.1 kg (211 lb 14.4 oz)           Reviewed and updated as needed this visit by Provider         Review of Systems   EYES: NEGATIVE for vision changes or irritations  RESP: NEGATIVE for significant SOB  CV: NEGATIVE for chest pain, palpitations or peripheral edema  GI: NEGATIVE for nausea, abdominal pain, heartburn, or change in bowel habits  : NEGATIVE for frequency, dysuria, or hematuria  MUSCULOSKELETAL: + for significant myalgia  NEURO: NEGATIVE for weakness, dizziness or paresthesias  HEME: NEGATIVE for bleeding problems  PSYCHIATRIC: NEGATIVE for changes in mood or affect      Objective    /80   Pulse 81   Temp 101  F (38.3  C) (Oral)   Resp 17   Wt 98.6 kg (217 lb 4.8 oz)   LMP  (LMP Unknown)   SpO2 97%   Breastfeeding No   BMI 32.09 kg/m    Body mass index is 32.09 kg/m .  Physical Exam   GENERAL: alert and no distress  EYES: Eyes grossly normal to inspection, PERRL and conjunctivae and sclerae normal  HENT: ear canals and TM's normal, nose and mouth without ulcers or lesions  NECK: no adenopathy, no asymmetry, masses, or scars and thyroid normal to palpation  RESP: lungs clear to auscultation - no rales, rhonchi or wheezes  CV: regular rate and rhythm, normal S1 S2, no S3 or S4, no click or rub, no peripheral edema and peripheral pulses strong  MS: no gross musculoskeletal defects noted  NEURO: No focal changes  PSYCH: mentation appears normal, affect normal/bright      Both rapid strep test and influenza screen are negative.      Assessment & Plan     1. Upper respiratory tract infection, unspecified type  Symptoms supported by [place] viral syndrome with negative influenza and rapid strep screen.  Will use Robitussin as needed for cough suppressant.  Follow-up if symptoms worsen.  Supportive care with rest, hydration as well as alternating doses of Tylenol and Motrin recommended for fever and myalgia.  Patient advised that if symptoms change or she develops more respiratory  compromise to follow-up.  - Influenza A/B antigen  - Rapid strep screen  - Beta strep group A culture  - guaiFENesin-codeine (ROBITUSSIN AC) 100-10 MG/5ML solution; Take 5-10 mLs by mouth every 4 hours as needed for cough  Dispense: 180 mL; Refill: 0     See Patient Instructions    Return for if symptoms recur or worsen.    Jah Fritz MD  [facility]

## 2019-12-27 LAB
BACTERIA SPEC CULT: NORMAL
SPECIMEN SOURCE: NORMAL

## 2020-09-01 ENCOUNTER — OFFICE VISIT (OUTPATIENT)
Dept: INTERNAL MEDICINE | Facility: CLINIC | Age: 50
End: 2020-09-01
Payer: COMMERCIAL

## 2020-09-01 VITALS
BODY MASS INDEX: 32.53 KG/M2 | OXYGEN SATURATION: 98 % | SYSTOLIC BLOOD PRESSURE: 148 MMHG | DIASTOLIC BLOOD PRESSURE: 92 MMHG | HEART RATE: 61 BPM | WEIGHT: 220.3 LBS | RESPIRATION RATE: 18 BRPM

## 2020-09-01 DIAGNOSIS — M54.41 RIGHT-SIDED LOW BACK PAIN WITH RIGHT-SIDED SCIATICA, UNSPECIFIED CHRONICITY: Primary | ICD-10-CM

## 2020-09-01 DIAGNOSIS — R03.0 ELEVATED BLOOD PRESSURE READING WITHOUT DIAGNOSIS OF HYPERTENSION: ICD-10-CM

## 2020-09-01 DIAGNOSIS — E66.9 OBESITY (BMI 30-39.9): ICD-10-CM

## 2020-09-01 PROCEDURE — 99214 OFFICE O/P EST MOD 30 MIN: CPT | Performed by: INTERNAL MEDICINE

## 2020-09-01 RX ORDER — CYCLOBENZAPRINE HCL 5 MG
5 TABLET ORAL 3 TIMES DAILY PRN
Qty: 30 TABLET | Refills: 1 | Status: SHIPPED | OUTPATIENT
Start: 2020-09-01

## 2020-09-01 NOTE — PROGRESS NOTES
Fell - 14 years ago  Right lower back pain  Right lower back  Burning  Occasional radiation down leg to above knee  Occasional foot tingling  Worse with sitting in car prolonged sitting  Ibuprofen helps a little bit  Aleve - doesn't do much  Cool compresses help    148/92

## 2020-09-01 NOTE — PROGRESS NOTES
SUBJECTIVE                                                      HPI: Slime Sexton is a very pleasant 49 year old female who presents with back pain:    Patient reports injuring her right lower back approximately 14 years ago after a fall. She has been having intermittent flareups of her back pain since then, her most recent flareup starting several days ago. No precipitating trauma, injury, unusual exertion. Pain is located right lower back with occasional radiation down her right leg. Associated numbness and tingling in her right foot. No weakness. No urinary retention or incontinence. No fecal incontinence. No significant improvement with OTC analgesics.    BMI 33.    Patient's blood pressure is noted to be elevated today, even on repeat. No history of or treatment for high blood pressure. Patient does report recent headaches, but attributes these to her back pain. She is otherwise asymptomatic from a blood pressure perspective: no chest pain or palpitations, no shortness of breath, no light-headedness or dizziness, no vision changes.    OBJECTIVE                                                      BP (!) 150/98   Pulse 61   Resp 18   Wt 99.9 kg (220 lb 4.8 oz)   LMP 08/11/2020 (Within Days)   SpO2 98%   BMI 32.53 kg/m    Constitutional: well-appearing  Lumbar spine: no deformity, crepitus, or step-off; no spinal or paraspinal tenderness palpation; negative straight leg raise bilaterally  Musculoskeletal: normal gait and station    ASSESSMENT/PLAN                                                      (M54.41) Right-sided low back pain with right-sided sciatica, unspecified chronicity  (primary encounter diagnosis)  Plan: course of prednisone declined; Flexeril as needed for muscle spasms; referred to physical therapy for further evaluation and treatment - patient will be contacted to schedule.    (R03.0) Elevated blood pressure reading without diagnosis of hypertension  Plan: patient to return this  fall for blood pressure recheck.    Estella Nair MD   [facility]  [address] [place] [id number]  T: [phone], F: [phone]  (Note documentation was completed, in part, with [employer] voice-recognition software. Documentation was reviewed, but some grammatical, spelling, and word errors may remain.)

## 2020-11-17 ENCOUNTER — OFFICE VISIT (OUTPATIENT)
Dept: DERMATOLOGY | Facility: CLINIC | Age: 50
End: 2020-11-17
Payer: COMMERCIAL

## 2020-11-17 VITALS — HEART RATE: 63 BPM | OXYGEN SATURATION: 98 % | DIASTOLIC BLOOD PRESSURE: 83 MMHG | SYSTOLIC BLOOD PRESSURE: 138 MMHG

## 2020-11-17 DIAGNOSIS — L81.4 LENTIGO: ICD-10-CM

## 2020-11-17 DIAGNOSIS — L82.1 SEBORRHEIC KERATOSIS: ICD-10-CM

## 2020-11-17 DIAGNOSIS — D23.9 DERMATOFIBROMA: ICD-10-CM

## 2020-11-17 DIAGNOSIS — D18.01 ANGIOMA OF SKIN: ICD-10-CM

## 2020-11-17 DIAGNOSIS — D22.9 NEVUS: Primary | ICD-10-CM

## 2020-11-17 PROCEDURE — 99204 OFFICE O/P NEW MOD 45 MIN: CPT | Performed by: PHYSICIAN ASSISTANT

## 2020-11-17 NOTE — LETTER
11/17/2020         RE: Slime Sexton  [address]  [facility] [address]        Dear Colleague,    Thank you for referring your patient, Slime Sexton, to the [facility]. Please see a copy of my visit note below.    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   On review of systems, there are no further skin complaints, patient is feeling otherwise well.  See patient intake sheet.  ROS of the following were done and are negative: Constitutional, Eyes, Ears, Nose,   Mouth, Throat, Cardiovascular, Respiratory, GI, Genitourinary, Musculoskeletal,   Psychiatric, Endocrine, Allergic/Immunologic.    PHYSICAL EXAM:   /83   Pulse 63   SpO2 98%   Breastfeeding No   Skin exam performed as follows: Type 2 skin. Mood appropriate  Alert and Oriented X 3. Well developed, well nourished in no distress.  General appearance: Normal  Head including face: Normal  Eyes: conjunctiva and lids: Normal  Mouth: Lips, teeth, gums: Normal  Neck: Normal  Chest-breast/axillae: Normal  Back: Normal  Spleen and liver: Normal  Cardiovascular: Exam of peripheral vascular system by observation for swelling, varicosities, edema: Normal  Genitalia: groin, buttocks: Normal  Extremities: digits/nails (clubbing): Normal  Eccrine and Apocrine glands: Normal  Right upper extremity: Normal  Left upper extremity:  Normal  Right lower extremity: Normal  Left lower extremity: Normal  Skin: Scalp and body hair: See below    Pt deferred exam of breasts, groin, buttocks: No    Other physical findings:  1. Multiple pigmented macules on extremities and trunk  2. Multiple pigmented macules on face, trunk and extremities  3. Multiple vascular papules on trunk, arms and legs  4. Multiple scattered keratotic plaques  5. Firm subcutaneous nodule with dimple sign on the right ankle         Except as noted above, no other signs of skin cancer or melanoma.     ASSESSMENT/PLAN:   Benign Full skin cancer screening today. . Patient with history of none  Advised on monthly self exams and 1 year  Patient Education: Appropriate brochures given.    1. Multiple benign appearing nevi on arms, legs and trunk. Discussed ABCDEs of melanoma and sunscreen.   2. Multiple lentigos on arms, legs and trunk. Advised benign, no treatment needed.  3. Multiple scattered angiomas. Advised benign, no treatment needed.   4. Seborrheic keratosis on arms, legs and trunk. Advised benign, no treatment needed.  5. Dermatofibroma on the right ankle - advised benign no treatment needed.   6. Slime to follow up with Primary Care provider regarding elevated blood pressure.            Follow-up: yearly FSE/PRN sooner    1.) Patient was asked about new and changing moles. YES  2.) Patient received a complete physical skin examination: YES  3.) Patient was counseled to perform a monthly self skin examination: YES  Scribed By: Eulalia Vaughan, MS, PAJENIFER          Again, thank you for allowing me to participate in the care of your patient.        Sincerely,        Eulalia Vaughan PA-C

## 2020-11-17 NOTE — PROGRESS NOTES
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   On review of systems, there are no further skin complaints, patient is feeling otherwise well.  See patient intake sheet.  ROS of the following were done and are negative: Constitutional, Eyes, Ears, Nose,   Mouth, Throat, Cardiovascular, Respiratory, GI, Genitourinary, Musculoskeletal,   Psychiatric, Endocrine, Allergic/Immunologic.    PHYSICAL EXAM:   /83   Pulse 63   SpO2 98%   Breastfeeding No   Skin exam performed as follows: Type 2 skin. Mood appropriate  Alert and Oriented X 3. Well developed, well nourished in no distress.  General appearance: Normal  Head including face: Normal  Eyes: conjunctiva and lids: Normal  Mouth: Lips, teeth, gums: Normal  Neck: Normal  Chest-breast/axillae: Normal  Back: Normal  Spleen and liver: Normal  Cardiovascular: Exam of peripheral vascular system by observation for swelling, varicosities, edema: Normal  Genitalia: groin, buttocks: Normal  Extremities: digits/nails (clubbing): Normal  Eccrine and Apocrine glands: Normal  Right upper extremity: Normal  Left upper extremity: Normal  Right lower extremity: Normal  Left lower extremity: Normal  Skin: Scalp and body hair: See below    Pt deferred exam of breasts, groin, buttocks: No    Other physical findings:  1. Multiple pigmented macules on extremities and trunk  2. Multiple pigmented macules on  face, trunk and extremities  3. Multiple vascular papules on trunk, arms and legs  4. Multiple scattered keratotic plaques  5. Firm subcutaneous nodule with dimple sign on the right ankle         Except as noted above, no other signs of skin cancer or melanoma.     ASSESSMENT/PLAN:   Benign Full skin cancer screening today. . Patient with history of none  Advised on monthly self exams and 1 year  Patient Education: Appropriate brochures given.    1. Multiple benign appearing nevi on arms, legs and trunk. Discussed ABCDEs of melanoma and sunscreen.   2. Multiple lentigos on arms, legs and trunk. Advised benign, no treatment needed.  3. Multiple scattered angiomas. Advised benign, no treatment needed.   4. Seborrheic keratosis on arms, legs and trunk. Advised benign, no treatment needed.  5. Dermatofibroma on the right ankle - advised benign no treatment needed.   6. Slime to follow up with Primary Care provider regarding elevated blood pressure.            Follow-up: yearly FSE/PRN sooner    1.) Patient was asked about new and changing moles. YES  2.) Patient received a complete physical skin examination: YES  3.) Patient was counseled to perform a monthly self skin examination: YES  Scribed By: Eulalia Vaughan, MS, PA-C

## 2020-11-22 ENCOUNTER — HEALTH MAINTENANCE LETTER (OUTPATIENT)
Age: 50
End: 2020-11-22

## 2021-05-11 ENCOUNTER — VIRTUAL VISIT (OUTPATIENT)
Dept: INTERNAL MEDICINE | Facility: CLINIC | Age: 51
End: 2021-05-11
Payer: COMMERCIAL

## 2021-05-11 DIAGNOSIS — L03.032 PARONYCHIA OF GREAT TOE, LEFT: Primary | ICD-10-CM

## 2021-05-11 PROCEDURE — 99213 OFFICE O/P EST LOW 20 MIN: CPT | Mod: 95 | Performed by: INTERNAL MEDICINE

## 2021-05-11 NOTE — PROGRESS NOTES
VIDEO VISIT                                                      ASSESSMENT/PLAN                                                      (L03.032) Paronychia of great toe, left  (primary encounter diagnosis)  Plan: TRIAL of warm damp compress application + massage of nail bed 2-3x/day for 1-2 weeks; if symptoms worsen, change, or do not improve, patient to contact MD.      Total time of video call between patient and provider was 13 minutes (1:40-1:53pm).    Estella Nair MD   [address] [address]  T: [phone], F: [phone]    SUBJECTIVE                                                      Slime Sexton is a very pleasant 50 year old female who requested a video visit to discuss toe redness, swelling, and pain:    Involves left big toe, along nail edge.  Symptoms have been ongoing for several months, fluctuating in severity, but never fully going away. Patient has had several mild injuries to her left big toe, but nothing major. Has been soaking feet in [place] salt bath with mild improvement in symptoms.    No fevers or chills. No purulent discharge. No significant progression of symptoms approximately.    OBJECTIVE                                                      General: appears well  Left big toe: redness and swelling around noted nail bed    ---    (Note was completed, in part, with [employer] voice-recognition software. Documentation reviewed, but some grammatical, spelling, and word errors may remain.)

## 2021-07-19 ENCOUNTER — TELEPHONE (OUTPATIENT)
Dept: INTERNAL MEDICINE | Facility: CLINIC | Age: 51
End: 2021-07-19

## 2021-07-19 NOTE — LETTER
[address] [address]  [phone]  July 19, 2021  Slime Sexton  [address] [place] [id number]    Dear Slime,    We care about your health and based on a review of your medical records, recommend the the following, to better manage your health:      You are in particular need of attention regarding:  -Breast Cancer Screening  -Cervical Cancer Screening  -Wellness (Physical) Visit     I am recommending that you:     -schedule a WELLNESS (Physical) APPOINTMENT with me.   I will check fasting labs the same day - nothing to eat except water and meds for 8-10 hours prior.    -schedule a MAMMOGRAM which is due.    1 in 8 women will develop invasive breast cancer during her lifetime and it is the most common non-skin cancer in American women.  EARLY detection, new treatments, and a better understanding of the disease have increased survival rates - the 5 year survival rate in the 1960s was 63% and today it is close to 90%.    If you are under/uninsured, we recommend you contact the [facility]. They offer mammograms at no charge or on a sliding fee charge. You can schedule with them at [phone]. Please have them send us the results.      Please disregard this reminder if you have had this exam elsewhere within the last year.  It would be helpful for us to have a copy of your mammogram report in your file so that we can best coordinate your care - please contact us with when your test was done so we can update your record.               Here is a list of Health Maintenance topics that are due now or due soon:  Health Maintenance Due   Topic Date Due     Preventive Care Visit  Never done     ANNUAL REVIEW OF HM ORDERS  Never done     Discuss Advance Care Planning  Never done     Mammogram  Never done     Colorectal Cancer Screening  Never done     HIV Screening  Never done     Hepatitis C Screening  Never done      PAP Smear  Never done     Diptheria Tetanus Pertussis (DTAP/TDAP/TD) Vaccine (1 - Tdap) Never done     Cholesterol Lab  Never done     PHQ-2  01/01/2021     Zoster (Shingles) Vaccine (1 of 2) 11/11/2020       Please call us at [phone] or [phone] (or use [employer]) to address the above recommendations.     Thank you for trusting [facility].  We appreciate the opportunity to serve you and look forward to supporting your healthcare needs in the future.    If you have (or plan to have) any of these tests done at a facility other than a [facility] or a [facility], please have the results from these tests sent to your primary physician at [facility].    Healthy Regards,    Estella Nair MD/Soraya CERVANTES MA

## 2021-07-19 NOTE — TELEPHONE ENCOUNTER
Patient Quality Outreach      Summary:    Patient has the following on her problem list/HM: None    Patient is due/failing the following:   Breast Cancer Screening - Mammogram and Adult/Adolescent physical, date due: asap    Type of outreach:    Sent letter.    Questions for provider review:    None                                                                                                                                     Soraya Plunkett CMA on 7/19/2021 at 3:33 PM       Chart routed to Care Team.

## 2021-09-18 ENCOUNTER — HEALTH MAINTENANCE LETTER (OUTPATIENT)
Age: 51
End: 2021-09-18

## 2022-01-08 ENCOUNTER — HEALTH MAINTENANCE LETTER (OUTPATIENT)
Age: 52
End: 2022-01-08

## 2022-07-07 ENCOUNTER — OFFICE VISIT (OUTPATIENT)
Dept: INTERNAL MEDICINE | Facility: CLINIC | Age: 52
End: 2022-07-07
Payer: COMMERCIAL

## 2022-07-07 VITALS
HEART RATE: 76 BPM | WEIGHT: 226 LBS | OXYGEN SATURATION: 98 % | TEMPERATURE: 98.4 F | SYSTOLIC BLOOD PRESSURE: 132 MMHG | HEIGHT: 69 IN | BODY MASS INDEX: 33.47 KG/M2 | DIASTOLIC BLOOD PRESSURE: 98 MMHG

## 2022-07-07 DIAGNOSIS — Z12.31 VISIT FOR SCREENING MAMMOGRAM: ICD-10-CM

## 2022-07-07 DIAGNOSIS — Z13.1 SCREENING FOR DIABETES MELLITUS: ICD-10-CM

## 2022-07-07 DIAGNOSIS — Z00.00 ROUTINE GENERAL MEDICAL EXAMINATION AT A HEALTH CARE FACILITY: Primary | ICD-10-CM

## 2022-07-07 DIAGNOSIS — Z13.6 ENCOUNTER FOR LIPID SCREENING FOR CARDIOVASCULAR DISEASE: ICD-10-CM

## 2022-07-07 DIAGNOSIS — Z13.220 ENCOUNTER FOR LIPID SCREENING FOR CARDIOVASCULAR DISEASE: ICD-10-CM

## 2022-07-07 DIAGNOSIS — R53.83 FATIGUE, UNSPECIFIED TYPE: ICD-10-CM

## 2022-07-07 PROCEDURE — 99396 PREV VISIT EST AGE 40-64: CPT | Performed by: NURSE PRACTITIONER

## 2022-07-07 ASSESSMENT — ENCOUNTER SYMPTOMS
BREAST MASS: 0
ABDOMINAL PAIN: 0
HEARTBURN: 0
FEVER: 0
COUGH: 0
FREQUENCY: 0
DIZZINESS: 0
PALPITATIONS: 0
MYALGIAS: 0
NERVOUS/ANXIOUS: 0
DYSURIA: 0
CHILLS: 0
JOINT SWELLING: 0
ARTHRALGIAS: 0
CONSTIPATION: 0
SHORTNESS OF BREATH: 0
WEAKNESS: 0
EYE PAIN: 0
SORE THROAT: 0
HEMATURIA: 0
NAUSEA: 0
HEADACHES: 0
DIARRHEA: 0
HEMATOCHEZIA: 0
PARESTHESIAS: 0

## 2022-07-07 NOTE — PATIENT INSTRUCTIONS
- Please schedule a lab visit for fasting lab work  - Please schedule your mammogram  - Think about pap smear and colon cancer screening  - Think about a tetanus shot, shingles vaccine  - Recommend second booster for COVID    Preventive Health Recommendations  Female Ages 50 - 64    Yearly exam: See your health care provider every year in order to  Review health changes.   Discuss preventive care.    Review your medicines if your doctor has prescribed any.    Get a Pap test every three years (unless you have an abnormal result and your provider advises testing more often).  If you get Pap tests with HPV test, you only need to test every 5 years, unless you have an abnormal result.   You do not need a Pap test if your uterus was removed (hysterectomy) and you have not had cancer.  You should be tested each year for STDs (sexually transmitted diseases) if you're at risk.   Have a mammogram every 1 to 2 years.  Have a colonoscopy at age 50, or have a yearly FIT test (stool test). These exams screen for colon cancer.    Have a cholesterol test every 5 years, or more often if advised.  Have a diabetes test (fasting glucose) every three years. If you are at risk for diabetes, you should have this test more often.   If you are at risk for osteoporosis (brittle bone disease), think about having a bone density scan (DEXA).    Shots: Get a flu shot each year. Get a tetanus shot every 10 years.    Nutrition:   Eat at least 5 servings of fruits and vegetables each day.  Eat whole-grain bread, whole-wheat pasta and brown rice instead of white grains and rice.  Get adequate Calcium and Vitamin D.     Lifestyle  Exercise at least 150 minutes a week (30 minutes a day, 5 days a week). This will help you control your weight and prevent disease.  Limit alcohol to one drink per day.  No smoking.   Wear sunscreen to prevent skin cancer.   See your dentist every six months for an exam and cleaning.  See your eye doctor every 1 to 2  years.

## 2022-07-07 NOTE — PROGRESS NOTES
SUBJECTIVE:   CC: Slime Sexton is a very pleasant 51 year old woman who presents for preventive health visit.       Patient has been advised of split billing requirements and indicates understanding: Yes     Healthy Habits:     Getting at least 3 servings of Calcium per day:  Yes    Bi-annual eye exam:  NO    Dental care twice a year:  Yes    Sleep apnea or symptoms of sleep apnea:  None    Diet:  Regular (no restrictions)    Frequency of exercise:  None    Duration of exercise:  Other    Taking medications regularly:  Yes    Barriers to taking medications:  None    Medication side effects:  None    PHQ-2 Total Score: 0    Additional concerns today:  Yes    Health Maintenance Screening:    -Immunizations:   -Influenza: NA, not applicable at this time   -Pneumococcal: NA   -Td/Tdap: Due- declines   -Shingles: Due- declines   -HPV: NA   -MMR: NA   -COVID  Parveen x1, Pfizer x1; second booster due- declines    -Colon Cancer Screening:  Due, declines screening at this time    -Lung Cancer Screening:  NA    -Breast Cancer Screening: Due- has not had baseline mammogram    -Cervical Cancer Screening:  Due- declines    -STD Screen:  Low risk    -Cholesterol Screening:  Due    -Diabetes Screening:  Due    -Depression Screening:  Due    Today's PHQ-2 Score:   PHQ-2 ( 1999 Pfizer) 7/7/2022   Q1: Little interest or pleasure in doing things 0   Q2: Feeling down, depressed or hopeless 0   PHQ-2 Score 0   PHQ-2 Total Score (12-17 Years)- Positive if 3 or more points; Administer PHQ-A if positive -   Q1: Little interest or pleasure in doing things Not at all   Q2: Feeling down, depressed or hopeless Not at all   PHQ-2 Score 0     Perimenopause  Fatigue  Gaining weight; hard time losing.  Not exercising; states diet could be better.  Feels tired a lot.  Wondering about hormonal changes and any testing/labs.    Abuse: Current or Past (Physical, Sexual or Emotional) - No  Do you feel safe in your environment? Yes-not  great    Have you ever done Advance Care Planning? (For example, a Health Directive, POLST, or a discussion with a medical provider or your loved ones about your wishes): No, advance care planning information given to patient to review.  Patient declined advance care planning discussion at this time.    Social History     Tobacco Use     Smoking status: Never Smoker     Smokeless tobacco: Never Used   Substance Use Topics     Alcohol use: Yes     Alcohol/week: 0.8 standard drinks     Types: 1 Cans of beer per week     Comment: per month     If you drink alcohol do you typically have >3 drinks per day or >7 drinks per week? No    Alcohol Use 2022   Prescreen: >3 drinks/day or >7 drinks/week? Not Applicable   No flowsheet data found.    Reviewed orders with patient.  Reviewed health maintenance and updated orders accordingly - Yes  Labs reviewed in EPIC    Breast Cancer Screening:    Breast CA Risk Assessment (FHS-7) 2022   Do you have a family history of breast, colon, or ovarian cancer? No / Unknown         Mammogram Screening: Recommended annual mammography  Pertinent mammograms are reviewed under the imaging tab.    History of abnormal Pap smear: NO - age 30-65 PAP every 5 years with negative HPV co-testing recommended     Reviewed and updated as needed this visit by clinical staff      Problems               Reviewed and updated as needed this visit by Provider                   History reviewed. No pertinent past medical history.   Past Surgical History:   Procedure Laterality Date     OVARY SURGERY       OB History    Para Term  AB Living   4 0 0 0 0 2   SAB IAB Ectopic Multiple Live Births   0 0 0 0 0      # Outcome Date GA Lbr Deep/2nd Weight Sex Delivery Anes PTL Lv   4             3             2             1                 Review of Systems   Constitutional: Negative for chills and fever.   HENT: Negative for congestion, ear pain, hearing loss and sore  "throat.    Eyes: Negative for pain and visual disturbance.   Respiratory: Negative for cough and shortness of breath.    Cardiovascular: Negative for chest pain, palpitations and peripheral edema.   Gastrointestinal: Negative for abdominal pain, constipation, diarrhea, heartburn, hematochezia and nausea.   Breasts:  Negative for tenderness, breast mass and discharge.   Genitourinary: Negative for dysuria, frequency, genital sores, hematuria, pelvic pain, urgency, vaginal bleeding and vaginal discharge.   Musculoskeletal: Negative for arthralgias, joint swelling and myalgias.   Skin: Negative for rash.   Neurological: Negative for dizziness, weakness, headaches and paresthesias.   Psychiatric/Behavioral: Negative for mood changes. The patient is not nervous/anxious.      CONSTITUTIONAL: NEGATIVE for fever, chills; POSITIVE for weight gain, fatigue  INTEGUMENTARU/SKIN: NEGATIVE for worrisome rashes, moles or lesions  EYES: NEGATIVE for vision changes or irritation  ENT: NEGATIVE for ear, mouth and throat problems  RESP: NEGATIVE for significant cough or SOB  BREAST: NEGATIVE for masses, tenderness or discharge  CV: NEGATIVE for chest pain, palpitations or peripheral edema  GI: NEGATIVE for nausea, abdominal pain, heartburn, or change in bowel habits  : NEGATIVE for unusual urinary or vaginal symptoms. Periods are regular.  MUSCULOSKELETAL: NEGATIVE for significant arthralgias or myalgia  NEURO: NEGATIVE for weakness, dizziness or paresthesias  PSYCHIATRIC: NEGATIVE for changes in mood or affect     OBJECTIVE:   BP (!) 132/98   Pulse 76   Temp 98.4  F (36.9  C) (Tympanic)   Ht 1.74 m (5' 8.5\")   Wt 102.5 kg (226 lb)   LMP 05/07/2022 (Approximate)   SpO2 98%   Breastfeeding No   BMI 33.86 kg/m       Physical Exam  GENERAL: healthy, alert and no distress  EYES: Eyes grossly normal to inspection, PERRL and conjunctivae and sclerae normal  HENT: ear canals and TM's normal, nose and mouth without ulcers or " "lesions  NECK: no adenopathy, no asymmetry, masses, or scars and thyroid normal to palpation  RESP: lungs clear to auscultation - no rales, rhonchi or wheezes  CV: regular rate and rhythm, normal S1 S2, no S3 or S4, no murmur, click or rub, no peripheral edema and peripheral pulses strong  ABDOMEN: soft, nontender, no hepatosplenomegaly, no masses and bowel sounds normal  MS: no gross musculoskeletal defects noted, no edema  SKIN: no suspicious lesions or rashes  NEURO: Normal strength and tone, mentation intact and speech normal  PSYCH: mentation appears normal, affect normal/bright    Diagnostic Test Results:  Labs reviewed in Epic    ASSESSMENT/PLAN:   Slime was seen today for physical.    Diagnoses and all orders for this visit:    Routine general medical examination at a health care facility  -     REVIEW OF HEALTH MAINTENANCE PROTOCOL ORDERS  -     Declines immunizations at this time  -     Declines pap, colon cancer screening at this time  -     Mammogram ordered  -     Future labs ordered- to be done when fasting    Visit for screening mammogram  -     MA SCREENING DIGITAL BILAT - Future  (s+30); Future    Encounter for lipid screening for cardiovascular disease  -     Lipid panel reflex to direct LDL Fasting; Future    Screening for diabetes mellitus  -     Glucose; Future    Fatigue, unspecified type  -     TSH with free T4 reflex; Future  -     Vitamin D Deficiency; Future  -     Discussed concerns- would not recommend additional hormonal testing other than what is ordered (TSH, Vit D)        Patient has been advised of split billing requirements and indicates understanding: Yes    COUNSELING:  Reviewed preventive health counseling, as reflected in patient instructions    Estimated body mass index is 32.53 kg/m  as calculated from the following:    Height as of 3/2/17: 1.753 m (5' 9\").    Weight as of 9/1/20: 99.9 kg (220 lb 4.8 oz).    Weight management plan: Discussed healthy diet and exercise " guidelines    She reports that she has never smoked. She has never used smokeless tobacco.      Counseling Resources:  ATP IV Guidelines  Pooled Cohorts Equation Calculator  Breast Cancer Risk Calculator  BRCA-Related Cancer Risk Assessment: FHS-7 Tool  FRAX Risk Assessment  ICSI Preventive Guidelines  Dietary Guidelines for Americans, 2010  USDA's MyPlate  ASA Prophylaxis  Lung CA Screening    ROBSON Hills CNP [facility]

## 2022-11-20 ENCOUNTER — HEALTH MAINTENANCE LETTER (OUTPATIENT)
Age: 52
End: 2022-11-20

## 2023-04-15 ENCOUNTER — HEALTH MAINTENANCE LETTER (OUTPATIENT)
Age: 53
End: 2023-04-15

## 2023-09-16 ENCOUNTER — HEALTH MAINTENANCE LETTER (OUTPATIENT)
Age: 53
End: 2023-09-16

## 2024-11-03 ENCOUNTER — HEALTH MAINTENANCE LETTER (OUTPATIENT)
Age: 54
End: 2024-11-03

## 2024-12-30 ENCOUNTER — VIRTUAL VISIT (OUTPATIENT)
Dept: FAMILY MEDICINE | Facility: CLINIC | Age: 54
End: 2024-12-30
Payer: COMMERCIAL

## 2024-12-30 DIAGNOSIS — G47.00 INSOMNIA, UNSPECIFIED TYPE: Primary | ICD-10-CM

## 2024-12-30 DIAGNOSIS — F41.9 ANXIETY: ICD-10-CM

## 2024-12-30 PROCEDURE — 99213 OFFICE O/P EST LOW 20 MIN: CPT | Mod: 95

## 2024-12-30 PROCEDURE — G2211 COMPLEX E/M VISIT ADD ON: HCPCS | Mod: 95

## 2024-12-30 RX ORDER — TRAZODONE HYDROCHLORIDE 50 MG/1
TABLET, FILM COATED ORAL
Qty: 90 TABLET | Refills: 0 | Status: SHIPPED | OUTPATIENT
Start: 2024-12-30

## 2024-12-30 NOTE — PROGRESS NOTES
Mariza is a 54 year old who is being evaluated via a billable video visit.    How would you like to obtain your AVS? MyChart  If the video visit is dropped, the invitation should be resent by: Text to cell phone: [phone]  Will anyone else be joining your video visit? No      Assessment & Plan     Insomnia, unspecified type  Will do trial of trazodone. Advised to start with 1/2 pill for her first dose and if no effect after one hour can take other half.     Anxiety  Suspect this is exacerbated due to situational stressors and lack of sleep. Offered to start medications but patient declines - she states she reacts poorly/differently to medications and is afraid to try it but will think about it. Discussed possibility of genesight/pharmacogenomic testing and she stated she will reach out if she decides she would like to pursue this.                 Subjective   Mariza is a 54 year old, presenting for the following health issues:  Sleep Problem (Experiencing grief /Recent food poisoning. /)        12/30/2024     1:22 PM   Additional Questions   Roomed by Genny RODRIGUEZ     History of Present Illness       Reason for visit:  Sleep problem  Symptom onset:  More than a month  Symptoms include:  Over 2 years, worse recently  Symptom intensity:  Severe  Symptom progression:  Worsening  Had these symptoms before:  Yes  Has tried/received treatment for these symptoms:  No   She is taking medications regularly.    Would like link sent to [phone]     Recent stressors have made it hard to sleep- had to unexpectedly put dog down, and holidays are a hard time of the year.   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 Other times she has no effects from meds  - hesitant to try any SSRIs or medications due to this          Objective    Vitals - Patient Reported  Weight (Patient Reported): 98.9 kg (218 lb)  Height (Patient Reported): 176.5 cm (5' 9.5\")  Pain Score: No Pain (0)        Vitals:  No vitals were obtained today due to virtual visit.    Physical Exam   GENERAL: alert and no distress  EYES: Eyes grossly normal to inspection.  No discharge or erythema, or obvious scleral/conjunctival abnormalities.  RESP: No audible wheeze, cough, or visible cyanosis.    SKIN: Visible skin clear. No significant rash, abnormal pigmentation or lesions.  PSYCH: Appropriate affect, tone, and pace of words. Tearful at times          Video-Visit Details    Type of service:  Video Visit   Originating Location (pt. Location): Home  Distant Location (provider location):  On-site  Platform used for Video Visit: Pepe  Signed Electronically by: ROBSON Ron CNP    "

## 2025-02-18 ENCOUNTER — PATIENT OUTREACH (OUTPATIENT)
Dept: FAMILY MEDICINE | Facility: CLINIC | Age: 55
End: 2025-02-18
Payer: COMMERCIAL

## 2025-02-18 NOTE — TELEPHONE ENCOUNTER
Patient Quality Outreach    Patient is due for the following:   Colon Cancer Screening  Breast Cancer Screening - Mammogram  Cervical Cancer Screening - PAP Needed  Physical Preventive Adult Physical      Topic Date Due    Hepatitis B Vaccine (1 of 3 - 19+ 3-dose series) Never done    Diptheria Tetanus Pertussis (DTAP/TDAP/TD) Vaccine (1 - Tdap) Never done    Pneumococcal Vaccine (1 of 1 - PCV) Never done    Zoster (Shingles) Vaccine (1 of 2) Never done    Flu Vaccine (1) 09/01/2024    COVID-19 Vaccine (4 - 2024-25 season) 09/01/2024       Action(s) Taken:   Schedule a office visit for mammo Adult Preventative    Type of outreach:    Sent Bunchball message.    Questions for provider review:    None           Jo-Ann Nino, CMA

## 2025-04-19 ENCOUNTER — HEALTH MAINTENANCE LETTER (OUTPATIENT)
Age: 55
End: 2025-04-19

## 2025-06-02 ENCOUNTER — TELEPHONE (OUTPATIENT)
Dept: FAMILY MEDICINE | Facility: CLINIC | Age: 55
End: 2025-06-02
Payer: COMMERCIAL

## 2025-06-02 NOTE — TELEPHONE ENCOUNTER
Patient Quality Outreach    Patient is due for the following:   Breast Cancer Screening - Mammogram  Physical Preventive Adult Physical    Action(s) Taken:   Schedule a Adult Preventative    Type of outreach:    Sent [employer] message.    Questions for provider review:    None         Marleni Yadav, [facility]  Chart routed to None.

## 2025-06-17 ENCOUNTER — VIRTUAL VISIT (OUTPATIENT)
Dept: FAMILY MEDICINE | Facility: CLINIC | Age: 55
End: 2025-06-17
Payer: COMMERCIAL

## 2025-06-17 DIAGNOSIS — N95.1 VASOMOTOR SYMPTOMS DUE TO MENOPAUSE: Primary | ICD-10-CM

## 2025-06-17 PROCEDURE — 98005 SYNCH AUDIO-VIDEO EST LOW 20: CPT

## 2025-06-17 SDOH — HEALTH STABILITY: PHYSICAL HEALTH: ON AVERAGE, HOW MANY MINUTES DO YOU ENGAGE IN EXERCISE AT THIS LEVEL?: 30 MIN

## 2025-06-17 SDOH — HEALTH STABILITY: PHYSICAL HEALTH: ON AVERAGE, HOW MANY DAYS PER WEEK DO YOU ENGAGE IN MODERATE TO STRENUOUS EXERCISE (LIKE A BRISK WALK)?: 2 DAYS

## 2025-06-17 ASSESSMENT — SOCIAL DETERMINANTS OF HEALTH (SDOH): HOW OFTEN DO YOU GET TOGETHER WITH FRIENDS OR RELATIVES?: MORE THAN THREE TIMES A WEEK

## 2025-06-17 NOTE — PROGRESS NOTES
Mariza is a 54 year old who is being evaluated via a billable video visit.    How would you like to obtain your AVS? MyChart  If the video visit is dropped, the invitation should be resent by: Text to cell phone: [phone]  Will anyone else be joining your video visit? No    Assessment & Plan     Vasomotor symptoms due to menopause  Discussed hormonal and non hormonal options for menopausal symptoms. Pt eligible for hormone therapy and desires this. Discussed possible risks/benefits. Will start HRT and follow up in 2-3 months or sooner if needed. Pt overdue for health maintenance - recommend getting lipids, mammo, pap and colonoscopy up to date, especially with starting hormone therapy  - estradiol (ESTRACE) 1 MG tablet; Take 1 tablet (1 mg) by mouth daily.  - medroxyPROGESTERone (PROVERA) 2.5 MG tablet; Take 1 tablet (2.5 mg) by mouth daily.            Follow-up   No follow-ups on file.     Follow-up Visit   Expected date:  Aug 18, 2025 (Approximate)      Follow Up Appointment Details:     Follow-up with whom?: Me    Follow-Up for what?: Other (Office Visit)    Additional Details: HRT    How?: In Person                 Subjective   Mariza is a 54 year old, presenting for the following health issues:  Derm Problem (Reaction from medication )        6/17/2025    10:54 AM   Additional Questions   Roomed by Andreea STORM CMA     History of Present Illness       Reason for visit:  Cough, congestion, fever  Symptom onset:  3-7 days ago  Symptoms include:  Cough, congestion, fever, dark yellow phlegm  Symptom intensity:  Mild  Symptom progression:  Staying the same  Had these symptoms before:  Yes   She is taking medications regularly.      Rash  Description  Location: right leg hives   Character: red  Itching: mild  Intensity:  mild  Progression of Symptoms:  improving  Accompanying signs and symptoms:   Fever: No  Body aches or joint pain: YES  Sore throat symptoms: No  Recent cold symptoms: YES recent sinus  infection  History:           Previous episodes of similar rash: None  New exposures:  medication /- amoxicillin-clavulanate (AUGMENTIN) 875-125 MG tablet   Recent travel: No  Exposure to similar rash: No  Therapies tried and outcome: Benadryl/diphenhydramine -  effective    Hives from amox- just an FYI for me     History of Present Illness    - Hormonal Symptoms: Mariza has been experiencing symptoms of low hormones for years, including sleep disturbances and increased sensitivity to stimuli. She reports flushing and increased sweating, but not night sweats. She attributes sleep issues to hormonal changes and reports being a light sleeper, with awareness of household noises.    - Joint Pain: Mariza is experiencing shoulder pain, previously treated with guided injections, and recent onset of similar symptoms in the left hip. She is concerned about worsening symptoms in the left hip, including difficulty moving after standing.       *was on SHAGUFTA for ~20 yrs  Then restarted and had bad SE  Tried nuva ring and didn't like this     The ASCVD Risk score (Becky RIVERA, et al., 2019) failed to calculate for the following reasons:    The systolic blood pressure is missing    Cannot find a previous HDL lab    Cannot find a previous total cholesterol lab     HRT  LMP: 3-6 months ago    Hysterectomy? No   ovaries present?: yes  Endometrial ablation?: no    Contraindications if YES-  Breast cancer: No  Endometrial cancer: No  History of blood clots: No  Unexplained vaginal bleeding: No  History of CAD: No  Liver disease: No  History of stroke or TIA: No    Uterus present: YES   History of migraine with aura: No  ASCVD risk: The ASCVD Risk score (Becky RIVERA, et al., 2019) failed to calculate for the following reasons:    The systolic blood pressure is missing    Cannot find a previous HDL lab    Cannot find a previous total cholesterol lab     Discussed:  Typically stop by age 65            Objective           Vitals:  No vitals were  obtained today due to virtual visit.    Physical Exam   GENERAL: alert and no distress  EYES: Eyes grossly normal to inspection.  No discharge or erythema, or obvious scleral/conjunctival abnormalities.  RESP: No audible wheeze, cough, or visible cyanosis.    SKIN: Visible skin clear. No significant rash, abnormal pigmentation or lesions.  NEURO: Cranial nerves grossly intact.  Mentation and speech appropriate for age.  PSYCH: Appropriate affect, tone, and pace of words          Video-Visit Details    Type of service:  Video Visit   Originating Location (pt. Location): Home  Distant Location (provider location):  On-site  Platform used for Video Visit: Pepe  Signed Electronically by: ROBSON Ron CNP

## 2025-06-18 RX ORDER — ESTRADIOL 1 MG/1
1 TABLET ORAL DAILY
Qty: 30 TABLET | Refills: 2 | Status: SHIPPED | OUTPATIENT
Start: 2025-06-18

## 2025-06-18 RX ORDER — MEDROXYPROGESTERONE ACETATE 2.5 MG/1
2.5 TABLET ORAL DAILY
Qty: 30 TABLET | Refills: 2 | Status: SHIPPED | OUTPATIENT
Start: 2025-06-18

## 2025-06-24 ENCOUNTER — TELEPHONE (OUTPATIENT)
Dept: FAMILY MEDICINE | Facility: CLINIC | Age: 55
End: 2025-06-24

## 2025-07-22 DIAGNOSIS — N95.1 VASOMOTOR SYMPTOMS DUE TO MENOPAUSE: ICD-10-CM

## 2025-07-23 RX ORDER — ESTRADIOL 1 MG/1
1 TABLET ORAL DAILY
Qty: 90 TABLET | Refills: 0 | Status: SHIPPED | OUTPATIENT
Start: 2025-07-23

## 2025-08-22 ENCOUNTER — ANCILLARY PROCEDURE (OUTPATIENT)
Dept: MAMMOGRAPHY | Facility: CLINIC | Age: 55
End: 2025-08-22
Payer: COMMERCIAL

## 2025-08-22 ENCOUNTER — ORDERS ONLY (AUTO-RELEASED) (OUTPATIENT)
Dept: FAMILY MEDICINE | Facility: CLINIC | Age: 55
End: 2025-08-22

## 2025-08-22 DIAGNOSIS — Z12.11 SCREEN FOR COLON CANCER: ICD-10-CM

## 2025-08-22 DIAGNOSIS — Z12.31 VISIT FOR SCREENING MAMMOGRAM: ICD-10-CM

## 2025-08-22 PROCEDURE — 77067 SCR MAMMO BI INCL CAD: CPT | Mod: TC | Performed by: RADIOLOGY

## 2025-08-22 PROCEDURE — 77063 BREAST TOMOSYNTHESIS BI: CPT | Mod: TC | Performed by: RADIOLOGY

## 2025-08-24 DIAGNOSIS — N95.1 VASOMOTOR SYMPTOMS DUE TO MENOPAUSE: ICD-10-CM

## 2025-08-25 ENCOUNTER — PATIENT OUTREACH (OUTPATIENT)
Dept: CARE COORDINATION | Facility: CLINIC | Age: 55
End: 2025-08-25
Payer: COMMERCIAL

## 2025-08-25 RX ORDER — ESTRADIOL 1 MG/1
1 TABLET ORAL DAILY
Qty: 90 TABLET | OUTPATIENT
Start: 2025-08-25